# Patient Record
Sex: FEMALE | Race: OTHER
[De-identification: names, ages, dates, MRNs, and addresses within clinical notes are randomized per-mention and may not be internally consistent; named-entity substitution may affect disease eponyms.]

---

## 2017-11-24 ENCOUNTER — HOSPITAL ENCOUNTER (EMERGENCY)
Dept: HOSPITAL 62 - ER | Age: 27
Discharge: HOME | End: 2017-11-24
Payer: SELF-PAY

## 2017-11-24 VITALS — SYSTOLIC BLOOD PRESSURE: 123 MMHG | DIASTOLIC BLOOD PRESSURE: 70 MMHG

## 2017-11-24 DIAGNOSIS — R11.0: ICD-10-CM

## 2017-11-24 DIAGNOSIS — F17.200: ICD-10-CM

## 2017-11-24 DIAGNOSIS — M79.1: ICD-10-CM

## 2017-11-24 DIAGNOSIS — J02.9: Primary | ICD-10-CM

## 2017-11-24 PROCEDURE — 87880 STREP A ASSAY W/OPTIC: CPT

## 2017-11-24 PROCEDURE — 87070 CULTURE OTHR SPECIMN AEROBIC: CPT

## 2017-11-24 PROCEDURE — 99283 EMERGENCY DEPT VISIT LOW MDM: CPT

## 2017-11-24 PROCEDURE — 96372 THER/PROPH/DIAG INJ SC/IM: CPT

## 2017-11-26 ENCOUNTER — HOSPITAL ENCOUNTER (EMERGENCY)
Dept: HOSPITAL 62 - ER | Age: 27
Discharge: HOME | End: 2017-11-26
Payer: SELF-PAY

## 2017-11-26 VITALS — DIASTOLIC BLOOD PRESSURE: 63 MMHG | SYSTOLIC BLOOD PRESSURE: 112 MMHG

## 2017-11-26 DIAGNOSIS — J06.9: Primary | ICD-10-CM

## 2017-11-26 DIAGNOSIS — Z87.891: ICD-10-CM

## 2017-11-26 DIAGNOSIS — R50.9: ICD-10-CM

## 2017-11-26 DIAGNOSIS — M79.1: ICD-10-CM

## 2017-11-26 LAB
APPEARANCE UR: (no result)
BASOPHILS # BLD AUTO: 0 10^3/UL (ref 0–0.2)
BASOPHILS NFR BLD AUTO: 0.2 % (ref 0–2)
BILIRUB UR QL STRIP: NEGATIVE
EOSINOPHIL # BLD AUTO: 0 10^3/UL (ref 0–0.6)
EOSINOPHIL NFR BLD AUTO: 0 % (ref 0–6)
ERYTHROCYTE [DISTWIDTH] IN BLOOD BY AUTOMATED COUNT: 13.7 % (ref 11.5–14)
GLUCOSE UR STRIP-MCNC: NEGATIVE MG/DL
HCT VFR BLD CALC: 41.2 % (ref 36–47)
HGB BLD-MCNC: 13.8 G/DL (ref 12–15.5)
HGB HCT DIFFERENCE: 0.2
KETONES UR STRIP-MCNC: (no result) MG/DL
LYMPHOCYTES # BLD AUTO: 1.4 10^3/UL (ref 0.5–4.7)
LYMPHOCYTES NFR BLD AUTO: 17.2 % (ref 13–45)
MCH RBC QN AUTO: 31.1 PG (ref 27–33.4)
MCHC RBC AUTO-ENTMCNC: 33.5 G/DL (ref 32–36)
MCV RBC AUTO: 93 FL (ref 80–97)
MONOCYTES # BLD AUTO: 0.9 10^3/UL (ref 0.1–1.4)
MONOCYTES NFR BLD AUTO: 10.6 % (ref 3–13)
NEUTROPHILS # BLD AUTO: 5.8 10^3/UL (ref 1.7–8.2)
NEUTS SEG NFR BLD AUTO: 72 % (ref 42–78)
NITRITE UR QL STRIP: NEGATIVE
PH UR STRIP: 5 [PH] (ref 5–9)
PROT UR STRIP-MCNC: 100 MG/DL
RBC # BLD AUTO: 4.45 10^6/UL (ref 3.72–5.28)
SP GR UR STRIP: 1.03
UROBILINOGEN UR-MCNC: NEGATIVE MG/DL (ref ?–2)
WBC # BLD AUTO: 8 10^3/UL (ref 4–10.5)

## 2017-11-26 PROCEDURE — 85025 COMPLETE CBC W/AUTO DIFF WBC: CPT

## 2017-11-26 PROCEDURE — 86308 HETEROPHILE ANTIBODY SCREEN: CPT

## 2017-11-26 PROCEDURE — 99283 EMERGENCY DEPT VISIT LOW MDM: CPT

## 2017-11-26 PROCEDURE — 96375 TX/PRO/DX INJ NEW DRUG ADDON: CPT

## 2017-11-26 PROCEDURE — 81001 URINALYSIS AUTO W/SCOPE: CPT

## 2017-11-26 PROCEDURE — 96361 HYDRATE IV INFUSION ADD-ON: CPT

## 2017-11-26 PROCEDURE — 36415 COLL VENOUS BLD VENIPUNCTURE: CPT

## 2017-11-26 PROCEDURE — 87880 STREP A ASSAY W/OPTIC: CPT

## 2017-11-26 PROCEDURE — 96365 THER/PROPH/DIAG IV INF INIT: CPT

## 2017-11-26 PROCEDURE — 87070 CULTURE OTHR SPECIMN AEROBIC: CPT

## 2017-11-26 PROCEDURE — 84703 CHORIONIC GONADOTROPIN ASSAY: CPT

## 2017-11-26 NOTE — ER DOCUMENT REPORT
ED Flu Like





- General


Chief Complaint: Flu Symptoms


Stated Complaint: FLU SYMPTOMS


Time Seen by Provider: 17 15:02


Mode of Arrival: Ambulatory


Information source: Patient


Notes: 





27-year-old female presents to ED for complaint of sore throat fever for a 

week.  Cold sweats not eating or drinking for 2 days.  States she was seen in 

the emergency room 3 or 4 days ago for the same thing.


TRAVEL OUTSIDE OF THE [place] IN LAST 30 DAYS: No





- HPI


Onset: Last week


Timing/Duration: Worse


Quality of pain: Sharp


Severity: Severe


Pain Level: 5


Associated symptoms: Body/muscle aches, Chills, Fever, Nausea, Rhinnorhea, 

Shortness of breath, Sore throat


Similar symptoms previously: Yes


Recently seen / treated by doctor: Yes





- Related Data


Allergies/Adverse Reactions: 


 





dextromethorphan HBr [From Robitussin-DM] Allergy (Verified 17 14:12)


 Anaphylaxis


guaifenesin [From Robitussin-DM] Allergy (Verified 17 14:12)


 Anaphylaxis


scallops Allergy (Uncoded 17 14:12)


 Anaphylaxis











Past Medical History





- General


Information source: Patient





- Social History


Smoking Status: Former Smoker - Stopped a week ago


Cigarette use (# per day): No


Chew tobacco use (# tins/day): No


Smoking Education Provided: No


Frequency of alcohol use: None


Drug Abuse: None


Lives with: Family - States she lives just her and her kids


Family History: Reviewed & Not Pertinent


Patient has suicidal ideation: No


Patient has homicidal ideation: No





- Past Medical History


Cardiac Medical History: Reports: Hx Hypertension - with 1st pregnancy 


Pulmonary Medical History: Reports: Hx Bronchitis


EENT Medical History: Reports: None


Neurological Medical History: Reports: None


Endocrine Medical History: Reports: None


Renal/ Medical History: Reports: None


Malignancy Medical History: Reports: None


GI Medical History: Reports: Hx Gastroesophageal Reflux Disease - zantac with 

pregnancy 


Musculoskeltal Medical History: Reports None


Skin Medical History: Reports None


Psychiatric Medical History: Reports: None


Traumatic Medical History: Reports: None


Infectious Medical History: Reports: None.  Denies: Hx HIV


Past Surgical History: Reports: Hx  Section - x2





Review of Systems





- Review of Systems


Constitutional: Fever, Recent illness


EENT: Nose discharge, Sinus discharge, Throat pain


Cardiovascular: No symptoms reported


Respiratory: No symptoms reported


Gastrointestinal: Nausea, Vomiting


Genitourinary: No symptoms reported


Female Genitourinary: No symptoms reported


Musculoskeletal: No symptoms reported


Skin: No symptoms reported


Hematologic/Lymphatic: No symptoms reported


Neurological/Psychological: No symptoms reported


-: Yes All other systems reviewed and negative





Physical Exam





- Vital signs


Vitals: 


 











Temp Pulse Resp BP Pulse Ox


 


 99.7 F   93   14   126/73 H  99 


 


 17 14:12  17 14:12  17 14:12  17 14:12  17 14:12











Interpretation: Normal





- General


General appearance: Appears well, Alert





- HEENT


Head: Normocephalic, Atraumatic


Eyes: Normal


Pupils: PERRL


Ears: Normal


External canal: Normal


Tympanic membrane: Normal


Sinus: Normal


Nasal: Purulent discharge, Swelling


Mouth/Lips: Normal


Mucous membranes: Normal


Pharynx: Erythema, Exudate, Post nasal drainage, Tonsillar hypertrophy.  No: 

Uvular edema, Potential airway comprom.


Neck: Anterior cervical chain





- Respiratory


Respiratory status: No respiratory distress


Chest status: Nontender


Breath sounds: Normal


Chest palpation: Normal





- Cardiovascular


Rhythm: Regular


Heart sounds: Normal auscultation


Murmur: No





- Abdominal


Inspection: Normal


Distension: No distension


Bowel sounds: Normal


Tenderness: Nontender


Organomegaly: No organomegaly





- Back


Back: Normal, Nontender





- Extremities


General upper extremity: Normal inspection, Nontender, Normal color, Normal ROM

, Normal temperature


General lower extremity: Normal inspection, Nontender, Normal color, Normal ROM

, Normal temperature, Normal weight bearing.  No: Soren's sign





- Neurological


Neuro grossly intact: Yes


Cognition: Normal


Orientation: AAOx4


Wilton Coma Scale Eye Opening: Spontaneous


Wilton Coma Scale Verbal: Oriented


Hunter Coma Scale Motor: Obeys Commands


Hunter Coma Scale Total: 15


Speech: Normal


Motor strength normal: LUE, RUE, LLE, RLE


Sensory: Normal





- Psychological


Associated symptoms: Normal affect, Normal mood





- Skin


Skin Temperature: Warm


Skin Moisture: Dry


Skin Color: Normal





Course





- Re-evaluation


Re-evalutation: 





17 16:52


Strep is negative mono is negative CBC is negative these were discussed with 

patient and with Dr. Shafer.  Will discharge patient home on a week of 

penicillin.  The sore throat may be viral but penicillin is not become 

resistant and it could help the patient who is had a sore throat and then to 

the ER for the sore throat 2.  Patient states she is feeling better after the 

steroids and Toradol.





- Vital Signs


Vital signs: 


 











Temp Pulse Resp BP Pulse Ox


 


 98.1 F   72   18   112/63   98 


 


 17 17:37  17 17:37  17 17:37  17 17:37  17 17:37














- Laboratory


Result Diagrams: 


 17 16:05





Laboratory results interpreted by me: 


 











  17





  16:05


 


Urine Protein  100 H


 


Urine Ketones  TRACE H


 


Urine Ascorbic Acid  40 H














Discharge





- Discharge


Clinical Impression: 


 Sore throat





Upper respiratory infection


Qualifiers:


 URI type: unspecified URI Qualified Code(s): J06.9 - Acute upper respiratory 

infection, unspecified





Condition: Stable


Disposition: HOME, SELF-CARE


Instructions:  Family Physicians / Practices


Additional Instructions: 


UPPER RESPIRATORY ILLNESS:





     You have a viral infection of the respiratory passages -- a "cold."  This 

common infection causes nasal congestion, drainage, and often sore throat and 

cough.  It is highly contagious.  The disease usually lasts about 10 to 14 days.


     There is no "cure" for the viral infection -- it must run its course.  If 

there is a complication, such as bacterial infection in the nose, sinuses, 

middle ear, or bronchial tubes, antibiotics may be required.  The antibiotics 

won't affect the virus.


     Drink plenty of fluids.  A humidifier may help.  An expectorant medication 

or decongestant may make you more comfortable.  Use acetaminophen or ibuprofen 

for fever or aches.


     See the doctor if fever persists over two days, if there is any 

significant worsening of your symptoms, or if you simply fail to improve as 

expected.





SORE THROAT:





     Sore throats may be caused by viruses, bacteria, or fungi.  Most are due 

to a virus, and must get better on their own.  Bacterial sore throats, 

particularly those due to "strep," need treatment with antibiotics.


     If an antibiotic is prescribed, be sure to take the medication for a full 

10 days.  Failure to take the antibiotic can result in complications such as 

rheumatic fever.  Sometimes, an injection of antibiotics is given instead of 

pills or liquid.  This single "shot" is equal in effectiveness to the oral 

medication.


     To relieve symptoms, take acetaminophen for pain.  Sip clear liquids 

frequently, or eat popsicles or ice chips.  Anesthetic sprays or lozenges may 

help.  Make sure the air in the room is not too dry. Avoid using decongestants 

or antihistamines.


     Call the doctor if there is no improvement in two days, or if you have 

difficulty breathing, increasing throat pain, high fever, rash, or frequent 

vomiting.





PENICILLIN V K:


     You have been given a prescription for Penicillin VK.  Your physician has 

determined that this is the best antibiotic for your condition.


     Pen VK can be taken with meals, however more of the antibiotic gets into 

the bloodstream if it's taken on an empty stomach.


     Penicillin usually has no side effects.  However, allergy to penicillins 

is common.  If you have had an allergic reaction to any drug of the penicillin 

family, you should never take any other penicillin.  Notify your doctor at once 

if you develop hives, itching, swelling, faintness, or shortness of breath.





STEROID MEDICATION:


     You have been given an injection of or oral medicine of the cortisone/

steroid class.  This medication is used to control inflammation or allergy.  

Naveen t is usually only given for a short period of time, until the acute process 

subsides.


     There are usually no side effects from short-term use of cortisone-like 

medications.  Some persons feel an increased sense of well-being and are not 

sleepy at bedtime.  Long-term use of cortisone medications is best avoided, 

unless required for a severe condition.  If your condition does not remit, or 

relapses after the course of corticosteroid medication, you should consult your 

physician.








USE OF ACETAMINOPHEN (Tylenol):


     Acetaminophen may be taken for pain relief or fever control. It's much 

safer than aspirin, offering a wider range of "safe" dosages.  It is safe 

during pregnancy.  Some brand names are Tylenol, Panadol, Datril, Anacin 3, 

Tempra, and Liquiprin. Acetaminophen can be repeated every four hours.  The 

following are maximum recommended dosages:


>89 pounds or adults          650 mg to 900 mg


Acetaminophen can be repeated every four hours.  Maximum dose not to exceed 

4000 mg a day.





Toradol Injection





     You have been given an injection of ketorolac tromethamine (Toradol).  

This is an excellent, safe drug for pain control.  It also has potent 

antiinflammatory action.  You should have significant pain relief within about 

one hour.


     Toradol is not addicting and is non-sedating.  It does not interfere with 

driving or work.


     Call or return if you develop itching, hives, shortness of breath, or rash.





Rocephin





     You have been given an injection of an antibiotic called Rocephin (

ceftriaxone).  Sometimes the injection must be combined with antibiotic pills.  

For some infections, such as an uncomplicated ear infection, Rocephin provides 

all the antibiotic that's needed.


     The antibiotic will be in your body for about two days.  For serious 

infections, we usually repeat doses of Rocephin daily.


     Side effects are very unusual following a shot.  Women may develop vaginal 

yeast infections, and babies can get yeast (thrush) in the mouth following the 

use of antibiotics.  Contact your physician if you have symptoms with this 

medication.


     Allergy to this antibiotic can result in hives, wheezing, faintness, or 

itching.  If symptoms of allergy occur, call the doctor at once.








FOLLOW-UP CARE:


If you have been referred to a physician for follow-up care, call the physician

s office for an appointment as you were instructed or within the next two days.

  If you experience worsening or a significant change in your symptoms, notify 

the physician immediately or return to the Emergency Department at any time for 

re-evaluation.





Prescriptions: 


Penicillin V Potassium [Penicillin Vk 500 mg Tablet] 500 mg PO QID #28 tablet


Forms:  Elevated Blood Pressure

## 2017-11-28 ENCOUNTER — HOSPITAL ENCOUNTER (EMERGENCY)
Dept: HOSPITAL 62 - ER | Age: 27
Discharge: HOME | End: 2017-11-28
Payer: SELF-PAY

## 2017-11-28 VITALS — DIASTOLIC BLOOD PRESSURE: 57 MMHG | SYSTOLIC BLOOD PRESSURE: 105 MMHG

## 2017-11-28 DIAGNOSIS — R59.0: ICD-10-CM

## 2017-11-28 DIAGNOSIS — R11.2: ICD-10-CM

## 2017-11-28 DIAGNOSIS — R50.9: ICD-10-CM

## 2017-11-28 DIAGNOSIS — J02.9: Primary | ICD-10-CM

## 2017-11-28 LAB
ALBUMIN SERPL-MCNC: 4.4 G/DL (ref 3.5–5)
ALP SERPL-CCNC: 50 U/L (ref 38–126)
ALT SERPL-CCNC: 30 U/L (ref 9–52)
ANION GAP SERPL CALC-SCNC: 14 MMOL/L (ref 5–19)
AST SERPL-CCNC: 20 U/L (ref 14–36)
BASOPHILS # BLD AUTO: 0 10^3/UL (ref 0–0.2)
BASOPHILS NFR BLD AUTO: 0.2 % (ref 0–2)
BILIRUB DIRECT SERPL-MCNC: 0.3 MG/DL (ref 0–0.4)
BILIRUB SERPL-MCNC: 0.6 MG/DL (ref 0.2–1.3)
BUN SERPL-MCNC: 11 MG/DL (ref 7–20)
CALCIUM: 9.1 MG/DL (ref 8.4–10.2)
CHLORIDE SERPL-SCNC: 102 MMOL/L (ref 98–107)
CO2 SERPL-SCNC: 27 MMOL/L (ref 22–30)
CREAT SERPL-MCNC: 0.86 MG/DL (ref 0.52–1.25)
EOSINOPHIL # BLD AUTO: 0 10^3/UL (ref 0–0.6)
EOSINOPHIL NFR BLD AUTO: 0.1 % (ref 0–6)
ERYTHROCYTE [DISTWIDTH] IN BLOOD BY AUTOMATED COUNT: 13.8 % (ref 11.5–14)
GLUCOSE SERPL-MCNC: 83 MG/DL (ref 75–110)
HCT VFR BLD CALC: 41.2 % (ref 36–47)
HGB BLD-MCNC: 14.1 G/DL (ref 12–15.5)
HGB HCT DIFFERENCE: 1.1
LIPASE SERPL-CCNC: 72 U/L (ref 23–300)
LYMPHOCYTES # BLD AUTO: 2.2 10^3/UL (ref 0.5–4.7)
LYMPHOCYTES NFR BLD AUTO: 27.4 % (ref 13–45)
MCH RBC QN AUTO: 31.5 PG (ref 27–33.4)
MCHC RBC AUTO-ENTMCNC: 34.2 G/DL (ref 32–36)
MCV RBC AUTO: 92 FL (ref 80–97)
MONOCYTES # BLD AUTO: 0.9 10^3/UL (ref 0.1–1.4)
MONOCYTES NFR BLD AUTO: 11.7 % (ref 3–13)
NEUTROPHILS # BLD AUTO: 4.8 10^3/UL (ref 1.7–8.2)
NEUTS SEG NFR BLD AUTO: 60.6 % (ref 42–78)
POTASSIUM SERPL-SCNC: 3.9 MMOL/L (ref 3.6–5)
PROT SERPL-MCNC: 7.7 G/DL (ref 6.3–8.2)
RBC # BLD AUTO: 4.47 10^6/UL (ref 3.72–5.28)
SODIUM SERPL-SCNC: 142.6 MMOL/L (ref 137–145)
WBC # BLD AUTO: 7.9 10^3/UL (ref 4–10.5)

## 2017-11-28 PROCEDURE — 85025 COMPLETE CBC W/AUTO DIFF WBC: CPT

## 2017-11-28 PROCEDURE — 87040 BLOOD CULTURE FOR BACTERIA: CPT

## 2017-11-28 PROCEDURE — 80053 COMPREHEN METABOLIC PANEL: CPT

## 2017-11-28 PROCEDURE — 36415 COLL VENOUS BLD VENIPUNCTURE: CPT

## 2017-11-28 PROCEDURE — 70491 CT SOFT TISSUE NECK W/DYE: CPT

## 2017-11-28 PROCEDURE — 86665 EPSTEIN-BARR CAPSID VCA: CPT

## 2017-11-28 PROCEDURE — 86664 EPSTEIN-BARR NUCLEAR ANTIGEN: CPT

## 2017-11-28 PROCEDURE — 86663 EPSTEIN-BARR ANTIBODY: CPT

## 2017-11-28 PROCEDURE — 86308 HETEROPHILE ANTIBODY SCREEN: CPT

## 2017-11-28 PROCEDURE — 96375 TX/PRO/DX INJ NEW DRUG ADDON: CPT

## 2017-11-28 PROCEDURE — 83690 ASSAY OF LIPASE: CPT

## 2017-11-28 PROCEDURE — 86256 FLUORESCENT ANTIBODY TITER: CPT

## 2017-11-28 PROCEDURE — 99283 EMERGENCY DEPT VISIT LOW MDM: CPT

## 2017-11-28 PROCEDURE — 96374 THER/PROPH/DIAG INJ IV PUSH: CPT

## 2017-11-28 PROCEDURE — 84703 CHORIONIC GONADOTROPIN ASSAY: CPT

## 2017-11-28 PROCEDURE — 96361 HYDRATE IV INFUSION ADD-ON: CPT

## 2017-11-28 NOTE — ER DOCUMENT REPORT
HPI





- HPI


Patient complains to provider of: Sore throat


Onset: Other - 5 days


Onset/Duration: Persistent


Quality of pain: Sharp


Pain Level: 5


Context: 





Patient presents complaining of sore throat for the past 5 days with nausea and 

vomiting.  Patient states she has had a fever of 101.2 yesterday.  Patient was 

evaluated here recently for the same complaint and continues with throat pain 

symptoms.  Patient states that she is compliant with taking her penicillin.  

Patient reports that she is unable to keep any liquids down due to her throat 

discomfort.


Associated Symptoms: Fever, Sore throat.  denies: Earache


Exacerbated by: Denies


Relieved by: Denies


Similar symptoms previously: No


Recently seen / treated by doctor: Yes





- ROS


ROS below otherwise negative: Yes


Systems Reviewed and Negative: Yes All other systems reviewed and negative





- CONSTITUTIONAL


Constitutional: REPORTS: Fever, Chills





- EENT


EENT: REPORTS: Sore Throat





- RESPIRATORY


Respiratory: DENIES: Coughing





- GASTROINTESTINAL


Gastrointestinal: REPORTS: Nausea, Patient vomiting.  DENIES: Abdominal Pain





- MUSCULOSKELETAL


Musculoskeletal: DENIES: Neck Pain





- DERM


Skin Color: Normal


Skin Problems: None





Past Medical History





- General


Information source: Patient





- Social History


Smoking Status: Never Smoker


Chew tobacco use (# tins/day): No


Frequency of alcohol use: None


Drug Abuse: None


Occupation: None


Lives with: Family


Family History: Reviewed & Not Pertinent


Patient has suicidal ideation: No


Patient has homicidal ideation: No





- Past Medical History


Cardiac Medical History: Reports: Hx Hypertension - with 1st pregnancy 


   Denies: Hx Pulmonary Embolism, Hx Heart Murmur


Pulmonary Medical History: Reports: Hx Bronchitis


   Denies: Hx Asthma, Hx Sleep Apnea, Hx Tuberculosis


Neurological Medical History: Denies: Hx Cerebrovascular Accident, Hx Seizures


Endocrine Medical History: Denies: Hx Hyperthyroidism, Hx Hypothyroidism


Renal/ Medical History: Denies: Hx Kidney Stones, Hx Ovarian Cysts, Hx 

Peritoneal Dialysis, Hx Pelvic Inflammatory Disease


Malignancy Medical History: Denies: Hx Breast Cancer, Hx Cervical Cancer, Hx 

Ovarian Cancer


GI Medical History: Reports: Hx Gastroesophageal Reflux Disease - zantac with 

pregnancy .  Denies: Hx Hiatal Hernia, Hx Ulcer


Musculoskeltal Medical History: Denies Hx Fibromyalgia


Psychiatric Medical History: 


   Denies: Hx Bipolar Disorder, Hx Depression, Hx Post Traumatic Stress Disorder

, Hx Schizophrenia


Traumatic Medical History: Denies: Hx Fractures


Infectious Medical History: Denies: Hx HIV


Past Surgical History: Reports: Hx  Section - x2





Vertical Provider Document





- CONSTITUTIONAL


Agree With Documented VS: Yes


Exam Limitations: No Limitations


General Appearance: WD/WN, No Apparent Distress





- INFECTION CONTROL


TRAVEL OUTSIDE OF THE [place] IN LAST 30 DAYS: No





- HEENT


HEENT: Atraumatic, Normocephalic, Pharyngeal Exudate, Pharyngeal Tenderness, 

Pharyngeal Erythema.  negative: Tympanic Membrane Red, Tympanic Membrane Bulging





- NECK


Neck: Lymphadenopathy-Left, Lymphadenopathy-Right





- RESPIRATORY


Respiratory: Breath Sounds Normal, No Respiratory Distress


O2 Sat by Pulse Oximetry: 98





- CARDIOVASCULAR


Cardiovascular: Regular Rate, Regular Rhythm, No Murmur





- BACK


Back: Normal Inspection.  negative: CVA Tenderness-Right, CVA Tenderness-Left





- MUSCULOSKELETAL/EXTREMETIES


Musculoskeletal/Extremeties: MAEW, FROM





- NEURO


Level of Consciousness: Awake, Alert, Appropriate


Motor/Sensory: No Motor Deficit





- DERM


Integumentary: Warm, Dry, No Rash





Course





- Re-evaluation


Re-evalutation: 





17 14:00


Dr hassan to consent for examination.  Agrees with plan for CT imaging and 

recommends consultation with ENT doctor.





17 14:15


Consulted with ENT DrAlicia, Dr. Castaneda who recommends giving patient Augmentin 

orally 3 times a day as well as Decadron 8 mg once daily for 3 days.  Also 

recommends ordering Cornelio-Barr virus titer and can follow-up as an outpatient 

in the office.





Patient has been able to tolerate oral fluids without vomiting.  Patient states 

she is feeling better, but states this is typically what has happened over the 

past few ER visits in which she feels better here and then her symptoms get 

worse at home.  Patient encouraged to continue to increase oral fluids.  No 

concern for any potential airway compromise at this time.  Voice normal.  No 

concern for peritonsillar abscess.








- Vital Signs


Vital signs: 


 











Temp Pulse Resp BP Pulse Ox


 


 99.7 F   94   18   123/71   98 


 


 17 11:45  17 11:45  17 11:45  17 11:45  17 11:45














- Laboratory


Result Diagrams: 


 17 13:45





 17 13:45


Laboratory results interpreted by me: 





17 15:48


 Labs- Entire Visit











  17





  13:45 13:45 13:45


 


WBC  7.9  


 


RBC  4.47  


 


Hgb  14.1  


 


Hct  41.2  


 


MCV  92  


 


MCH  31.5  


 


MCHC  34.2  


 


RDW  13.8  


 


Plt Count  219  


 


Seg Neutrophils %  60.6  


 


Lymphocytes %  27.4  


 


Monocytes %  11.7  


 


Eosinophils %  0.1  


 


Basophils %  0.2  


 


Absolute Neutrophils  4.8  


 


Absolute Lymphocytes  2.2  


 


Absolute Monocytes  0.9  


 


Absolute Eosinophils  0.0  


 


Absolute Basophils  0.0  


 


Sodium   142.6 


 


Potassium   3.9 


 


Chloride   102 


 


Carbon Dioxide   27 


 


Anion Gap   14 


 


BUN   11 


 


Creatinine   0.86 


 


Est GFR ( Amer)   > 60 


 


Est GFR (Non-Af Amer)   > 60 


 


Glucose   83 


 


Calcium   9.1 


 


Total Bilirubin   0.6 


 


Direct Bilirubin   0.3 


 


Neonat Total Bilirubin   Not Reportable 


 


Neonat Direct Bilirubin   Not Reportable 


 


Neonat Indirect Bili   Not Reportable 


 


AST   20 


 


ALT   30 


 


Alkaline Phosphatase   50 


 


Total Protein   7.7 


 


Albumin   4.4 


 


Lipase   72.0 


 


Serum HCG, Qual    NEGATIVE


 


Monotest   














  17





  13:45


 


WBC 


 


RBC 


 


Hgb 


 


Hct 


 


MCV 


 


MCH 


 


MCHC 


 


RDW 


 


Plt Count 


 


Seg Neutrophils % 


 


Lymphocytes % 


 


Monocytes % 


 


Eosinophils % 


 


Basophils % 


 


Absolute Neutrophils 


 


Absolute Lymphocytes 


 


Absolute Monocytes 


 


Absolute Eosinophils 


 


Absolute Basophils 


 


Sodium 


 


Potassium 


 


Chloride 


 


Carbon Dioxide 


 


Anion Gap 


 


BUN 


 


Creatinine 


 


Est GFR ( Amer) 


 


Est GFR (Non-Af Amer) 


 


Glucose 


 


Calcium 


 


Total Bilirubin 


 


Direct Bilirubin 


 


Neonat Total Bilirubin 


 


Neonat Direct Bilirubin 


 


Neonat Indirect Bili 


 


AST 


 


ALT 


 


Alkaline Phosphatase 


 


Total Protein 


 


Albumin 


 


Lipase 


 


Serum HCG, Qual 


 


Monotest  NEGATIVE














- Diagnostic Test


Radiology reviewed: Reports reviewed





Discharge





- Discharge


Clinical Impression: 


 Sore throat





Pharyngitis


Qualifiers:


 Pharyngitis/tonsillitis etiology: unspecified etiology Qualified Code(s): 

J02.9 - Acute pharyngitis, unspecified





Nausea & vomiting


Qualifiers:


 Vomiting type: unspecified Vomiting Intractability: non-intractable Qualified 

Code(s): R11.2 - Nausea with vomiting, unspecified





Condition: Stable


Disposition: HOME, SELF-CARE


Instructions:  Antinausea Medication ([facility]), Intravenous (IV) Fluids ([facility]), Sore 

Throat ([facility]), Vomiting ([facility])


Additional Instructions: 


Return immediately for any new or worsening symptoms





Followup with your primary care provider, call tomorrow to make a followup 

appointment





Increase oral fluids





You can stop taking the penicillin and instead take Augmentin.





Follow-up with the ear nose and throat doctor, call their office tomorrow for 

an appointment time


Prescriptions: 


Amox Tr/Potassium Clavulanate [Augmentin 400-57 mg/5 mL Suspension] 7 ml PO TID 

#210 ml


Dexamethasone [Decadron 4 Mg Tablet] 4 mg PO ASDIR #4 tablet


Hydrocodone/Acetaminophen [Lortab 7.5-325 mg/15 ml Oral Soln] 10 ml PO Q6H PRN #

120 ml


 PRN Reason: 


Ondansetron HCl [Zofran 4 mg Tablet] 1 - 2 tab PO Q6 PRN #15 tablet


 PRN Reason: 


Referrals: 


NESHA MERINO PA-C [Primary Care Provider] - Follow up as needed


[facility] [Provider Group] - Follow up tomorrow

## 2017-11-28 NOTE — RADIOLOGY REPORT (SQ)
EXAM DESCRIPTION:  CT SOFT TISSUE NECK WITH



COMPLETED DATE/TIME:  11/28/2017 2:39 pm



REASON FOR STUDY:  sore throat, ?abscess



COMPARISON:  None.



TECHNIQUE:  Post IV contrasted scanning from skull base through lung apices with review of bone, soft
 tissue and lung windows.  Reconstructed coronal and sagittal MPR images reviewed.  All images stored
 on PACS.

All CT scanners at this facility use dose modulation, iterative reconstruction, and/or weight based d
osing when appropriate to reduce radiation dose to as low as reasonably achievable (ALARA).

[facility]: Dose Right  [facility]: CareDose    [facility]: Dose Right    [facility]: Teradose 4D    [facility]: Smart Technologies



CONTRAST TYPE AND DOSE:  contrast/concentration: Isovue 370.00 mg/ml; Total Contrast Delivered: 75.0 
ml; Total Saline Delivered: 55.0 ml



RENAL FUNCTION:  None required. The patient is less than 50 years old.



RADIATION DOSE:  24.2 mGy .



LIMITATIONS:  None.



FINDINGS:  SKULL BASE: Intact.

MAJOR SALIVARY GLANDS: No solid or cystic masses.  No inflammatory changes.

LYMPHADENOPATHY: Mild cervical adenopathy is present along the carotid space and posterior triangles.
  Enlarged bilateral jugulodigastric lymph nodes, likely reactive given pharyngitis.

MUCOSAL MASSES OR ASYMMETRY: There is enlargement of the pharyngeal tonsils bilaterally.  No discrete
 intra tonsillar or peritonsillar abscess.  Right tonsil measures 3.8 x 3 x 2 cm in size.  Left tonsi
l measures 4 x 3 x 1.8 cm in size.

LARYNX/CORDS: No abnormal findings.

VASCULAR STRUCTURES: The major vessels are patent.

LUNG APICES: Clear.

BONES: Intact.

THYROID: Normal size.  No masses.

PARANASAL SINUSES: Clear.

OTHER: No other significant finding.



IMPRESSION:  Enlarged pharyngeal tonsils without discrete intra or peritonsillar abscess.  Reactive c
ervical adenopathy.



TECHNICAL DOCUMENTATION:  JOB ID:  [id number]

Quality ID # [id number]: Final reports with documentation of one or more dose reduction techniques (e.g., Au
tomated exposure control, adjustment of the mA and/or kV according to patient size, use of iterative 
reconstruction technique)

 2011 [employer]- All Rights Reserved

## 2017-11-30 LAB — EBV EA IGG SER-ACNC: <9 U/ML (ref 0–8.9)

## 2018-06-04 ENCOUNTER — HOSPITAL ENCOUNTER (EMERGENCY)
Dept: HOSPITAL 62 - ER | Age: 28
LOS: 1 days | Discharge: HOME | End: 2018-06-05
Payer: SELF-PAY

## 2018-06-04 DIAGNOSIS — R09.81: ICD-10-CM

## 2018-06-04 DIAGNOSIS — R50.9: ICD-10-CM

## 2018-06-04 DIAGNOSIS — J00: Primary | ICD-10-CM

## 2018-06-04 DIAGNOSIS — R05: ICD-10-CM

## 2018-06-04 DIAGNOSIS — R09.89: ICD-10-CM

## 2018-06-04 PROCEDURE — 99283 EMERGENCY DEPT VISIT LOW MDM: CPT

## 2018-06-05 VITALS — DIASTOLIC BLOOD PRESSURE: 55 MMHG | SYSTOLIC BLOOD PRESSURE: 107 MMHG

## 2018-06-05 NOTE — ER DOCUMENT REPORT
ED Fever





- General


Chief Complaint: Fever


Stated Complaint: FEVER


Time Seen by Provider: 18 00:49


Mode of Arrival: Ambulatory


Information source: Patient


Notes: 


28-year-old female patient with complaint of fever, cough, congestion, runny 

nose 12 hours.  Patient reports that she has not taken any medications at home 

for these symptoms.  Patient reports that she works at [employer] and has been 

exposed to many customers who she stated had similar symptoms.  Patient denies 

any nausea, vomiting or diarrhea.


TRAVEL OUTSIDE OF THE [place] IN LAST 30 DAYS: No





- Related Data


Allergies/Adverse Reactions: 


 





dextromethorphan HBr [From Robitussin-DM] Allergy (Verified 18 23:20)


 Anaphylaxis


guaifenesin [From Robitussin-DM] Allergy (Verified 18 23:20)


 Anaphylaxis


scallops Allergy (Uncoded 18 23:20)


 Anaphylaxis











Past Medical History





- General


Information source: Patient





- Social History


Smoking Status: Never Smoker


Frequency of alcohol use: None


Drug Abuse: None


Family History: Reviewed & Not Pertinent





- Past Medical History


Cardiac Medical History: Reports: Hx Hypertension - with 1st pregnancy 


   Denies: Hx Pulmonary Embolism, Hx Heart Murmur


Pulmonary Medical History: Reports: Hx Bronchitis


   Denies: Hx Asthma, Hx Sleep Apnea, Hx Tuberculosis


Neurological Medical History: Denies: Hx Cerebrovascular Accident, Hx Seizures


Endocrine Medical History: Denies: Hx Hyperthyroidism, Hx Hypothyroidism


Renal/ Medical History: Denies: Hx Kidney Stones, Hx Ovarian Cysts, Hx 

Peritoneal Dialysis, Hx Pelvic Inflammatory Disease


Malignancy Medical History: Denies: Hx Breast Cancer, Hx Cervical Cancer, Hx 

Ovarian Cancer


GI Medical History: Reports: Hx Gastroesophageal Reflux Disease - zantac with 

pregnancy .  Denies: Hx Hiatal Hernia, Hx Ulcer


Musculoskeltal Medical History: Denies Hx Fibromyalgia


Psychiatric Medical History: 


   Denies: Hx Bipolar Disorder, Hx Depression, Hx Post Traumatic Stress Disorder

, Hx Schizophrenia


Traumatic Medical History: Denies: Hx Fractures


Infectious Medical History: Denies: Hx HIV


Past Surgical History: Reports: Hx  Section - x2





Review of Systems





- Review of Systems


Constitutional: No symptoms reported


EENT: See HPI


Cardiovascular: No symptoms reported


Respiratory: See HPI


Gastrointestinal: No symptoms reported


Genitourinary: No symptoms reported


Female Genitourinary: No symptoms reported


Musculoskeletal: No symptoms reported


Skin: No symptoms reported


Hematologic/Lymphatic: No symptoms reported


Neurological/Psychological: No symptoms reported





Physical Exam





- Vital signs


Vitals: 


 











Temp Pulse Resp BP Pulse Ox


 


 99.6 F   94   20   111/58 L  100 


 


 18 23:27  18 23:27  18 23:27  18 23:27  18 23:27














- Notes


Notes: 





PHYSICAL EXAMINATION:





GENERAL: Well-appearing, well-nourished and in no acute distress.





HEAD: Atraumatic, normocephalic.





EYES: Pupils equal round and reactive to light, extraocular movements intact, 

conjunctiva are normal.





ENT: Nares patent, oropharynx clear without exudates.  Moist mucous membranes.





NECK: Normal range of motion, supple without lymphadenopathy





LUNGS: Breath sounds clear to auscultation bilaterally and equal.  No wheezes 

rales or rhonchi.





HEART: Regular rate and rhythm without murmurs





ABDOMEN: Soft, nontender, nondistended abdomen.  No guarding, no rebound.  No 

masses appreciated.





Female : deferred





Musculoskeletal: Normal range of motion, no pitting or edema.  No cyanosis.





NEUROLOGICAL: Cranial nerves grossly intact.  Normal speech, normal gait.  

Normal sensory, motor exams





PSYCH: Normal mood, normal affect.





SKIN: Warm, Dry, normal turgor, no rashes or lesions noted.





Course





- Re-evaluation


Re-evalutation: 


Patient's examination is consistent with viral upper respiratory illness.  

Patient has only had symptoms for 12 hours patient's exam is unremarkable.  

Will discharge patient with supportive care.  Patient agrees to follow-up with 

her primary care provider if she does not have improvement of her symptoms in 

the next 3-5 days.  Patient will return if she has worsening symptoms to 

include fever that is unrelieved with either acetaminophen or ibuprofen.





- Vital Signs


Vital signs: 


 











Temp Pulse Resp BP Pulse Ox


 


 100.5 F H  95   18   107/55 L  99 


 


 18 01:44  18 01:44  18 01:44  18 01:44  18 01:44














Discharge





- Discharge


Clinical Impression: 


Upper respiratory infection


Qualifiers:


 URI type: acute nasopharyngitis (common cold) Qualified Code(s): J00 - Acute 

nasopharyngitis [common cold]





Condition: Stable


Disposition: HOME, SELF-CARE


Additional Instructions: 


Upper Respiratory Illness





     You have a viral infection of the respiratory passages -- a "cold."  This 

common infection causes nasal congestion, drainage, and often sore throat and 

cough.  The disease usually lasts a week or more, though the worst symptoms are 

usually over in 3 or 4 days.


     There is no "cure" for the viral infection -- it must run its course.  If 

there is a complication, such as bacterial infection in the nose, sinuses, 

middle ear, or bronchial tubes, antibiotics may be required, but antibiotics won

't affect the virus.


     If you smoke, you should STOP!!  Drink plenty of fluids.  A humidifier may 

help.  An expectorant medication or decongestant may make you more comfortable.

  Use acetaminophen or ibuprofen for fever or aches.


     See the doctor if fever persists over two or three days, if there is any 

significant worsening of your symptoms, or if you simply fail to improve as 

expected.





Headache





     The physician does not feel that the headache you are experiencing has a 

serious underlying cause.  Most headaches are due to emotional stress, with 

resultant muscle tension (tension headache). Occasionally, headaches are 

secondary to changes in the blood vessels of the scalp (vascular headache and 

migraine headache).  Sometimes, a headache is the first symptom of another 

developing illness, such as a viral infection.  You have no evidence of stroke, 

bleeding, meningitis, or other serious cause of your headache.


     The treatment of headaches varies with the severity and cause of the pain.

  Not all headaches need pain shots.  In fact, there is evidence that using 

narcotics for headaches may make them worse in the long run.  The physician 

will determine the therapy that's in your best interest.


     If you develop a fever, if the headache is different from any you've 

previously experienced, or if the headache progressively worsens, then call 

your physician at once or go to the emergency room.








Please take medications as prescribed.  Follow up with your primary care 

provider in the next 3-5 days if your symptoms continue.





Prescriptions: 


Fluticasone Propionate [Flonase Allergy Relief] 9.9 ml NS BID #1 bottle


Ibuprofen [Motrin 800 mg Tablet] 800 mg PO Q8H PRN #30 tab


 PRN Reason: For Pain


Forms:  Return to Work


Referrals: 


NESHA MERINO PA-C [Primary Care Provider] - Follow up as needed

## 2018-12-03 ENCOUNTER — HOSPITAL ENCOUNTER (EMERGENCY)
Dept: HOSPITAL 62 - ER | Age: 28
Discharge: HOME | End: 2018-12-03
Payer: SELF-PAY

## 2018-12-03 VITALS — DIASTOLIC BLOOD PRESSURE: 58 MMHG | SYSTOLIC BLOOD PRESSURE: 104 MMHG

## 2018-12-03 DIAGNOSIS — R19.7: ICD-10-CM

## 2018-12-03 DIAGNOSIS — R11.10: Primary | ICD-10-CM

## 2018-12-03 DIAGNOSIS — R10.13: ICD-10-CM

## 2018-12-03 DIAGNOSIS — I10: ICD-10-CM

## 2018-12-03 LAB
ADD MANUAL DIFF: NO
ALBUMIN SERPL-MCNC: 4.4 G/DL (ref 3.5–5)
ALP SERPL-CCNC: 48 U/L (ref 38–126)
ALT SERPL-CCNC: 16 U/L (ref 9–52)
ANION GAP SERPL CALC-SCNC: 10 MMOL/L (ref 5–19)
AST SERPL-CCNC: 19 U/L (ref 14–36)
BASOPHILS # BLD AUTO: 0 10^3/UL (ref 0–0.2)
BASOPHILS NFR BLD AUTO: 0.2 % (ref 0–2)
BILIRUB DIRECT SERPL-MCNC: 0.2 MG/DL (ref 0–0.4)
BILIRUB SERPL-MCNC: 0.8 MG/DL (ref 0.2–1.3)
BUN SERPL-MCNC: 9 MG/DL (ref 7–20)
CALCIUM: 9.4 MG/DL (ref 8.4–10.2)
CHLORIDE SERPL-SCNC: 105 MMOL/L (ref 98–107)
CO2 SERPL-SCNC: 27 MMOL/L (ref 22–30)
EOSINOPHIL # BLD AUTO: 0.1 10^3/UL (ref 0–0.6)
EOSINOPHIL NFR BLD AUTO: 1.4 % (ref 0–6)
ERYTHROCYTE [DISTWIDTH] IN BLOOD BY AUTOMATED COUNT: 13.3 % (ref 11.5–14)
GLUCOSE SERPL-MCNC: 80 MG/DL (ref 75–110)
HCT VFR BLD CALC: 37.3 % (ref 36–47)
HGB BLD-MCNC: 12.7 G/DL (ref 12–15.5)
LIPASE SERPL-CCNC: 78.4 U/L (ref 23–300)
LYMPHOCYTES # BLD AUTO: 2.6 10^3/UL (ref 0.5–4.7)
LYMPHOCYTES NFR BLD AUTO: 36.3 % (ref 13–45)
MCH RBC QN AUTO: 31.9 PG (ref 27–33.4)
MCHC RBC AUTO-ENTMCNC: 33.9 G/DL (ref 32–36)
MCV RBC AUTO: 94 FL (ref 80–97)
MONOCYTES # BLD AUTO: 0.5 10^3/UL (ref 0.1–1.4)
MONOCYTES NFR BLD AUTO: 7.1 % (ref 3–13)
NEUTROPHILS # BLD AUTO: 3.9 10^3/UL (ref 1.7–8.2)
NEUTS SEG NFR BLD AUTO: 55 % (ref 42–78)
PLATELET # BLD: 258 10^3/UL (ref 150–450)
POTASSIUM SERPL-SCNC: 4 MMOL/L (ref 3.6–5)
PROT SERPL-MCNC: 7.9 G/DL (ref 6.3–8.2)
RBC # BLD AUTO: 3.97 10^6/UL (ref 3.72–5.28)
SODIUM SERPL-SCNC: 141.6 MMOL/L (ref 137–145)
TOTAL CELLS COUNTED % (AUTO): 100 %
WBC # BLD AUTO: 7.1 10^3/UL (ref 4–10.5)

## 2018-12-03 PROCEDURE — 99284 EMERGENCY DEPT VISIT MOD MDM: CPT

## 2018-12-03 PROCEDURE — 84703 CHORIONIC GONADOTROPIN ASSAY: CPT

## 2018-12-03 PROCEDURE — 83690 ASSAY OF LIPASE: CPT

## 2018-12-03 PROCEDURE — 96375 TX/PRO/DX INJ NEW DRUG ADDON: CPT

## 2018-12-03 PROCEDURE — 80053 COMPREHEN METABOLIC PANEL: CPT

## 2018-12-03 PROCEDURE — 96376 TX/PRO/DX INJ SAME DRUG ADON: CPT

## 2018-12-03 PROCEDURE — 36415 COLL VENOUS BLD VENIPUNCTURE: CPT

## 2018-12-03 PROCEDURE — S0119 ONDANSETRON 4 MG: HCPCS

## 2018-12-03 PROCEDURE — S0028 INJECTION, FAMOTIDINE, 20 MG: HCPCS

## 2018-12-03 PROCEDURE — 85025 COMPLETE CBC W/AUTO DIFF WBC: CPT

## 2018-12-03 PROCEDURE — 96374 THER/PROPH/DIAG INJ IV PUSH: CPT

## 2018-12-03 NOTE — ER DOCUMENT REPORT
HPI





- HPI


Patient complains to provider of: Sore throat


Pain Level: 5


Context: 





Patient is a 27-year-old female presents emergency department complaining of 

sore throat with associated  nausea, body aches.  Patient states that she has 

been having difficulty tolerating swallowing that she has been able to speak 

without any difficulty.  Denies any difficulty breathing, shortness of breath, 

cough, chest pain, vomiting, abdominal pain, diarrhea constipation.  She does 

admit to heartburn but she states that it is normal for her.





- REPRODUCTIVE


Reproductive: REPORTS: Pregnant:





Past Medical History





- Social History


Smoking Status: Current Every Day Smoker


Family History: Reviewed & Not Pertinent





- Past Medical History


Cardiac Medical History: Reports: Hx Hypertension - with 1st pregnancy 


   Denies: Hx Pulmonary Embolism, Hx Heart Murmur


Pulmonary Medical History: 


   Denies: Hx Asthma, Hx Sleep Apnea, Hx Tuberculosis


Neurological Medical History: Denies: Hx Cerebrovascular Accident, Hx Seizures


Endocrine Medical History: Denies: Hx Hyperthyroidism, Hx Hypothyroidism


Renal/ Medical History: Denies: Hx Kidney Stones, Hx Ovarian Cysts, Hx Pelvic 

Inflammatory Disease


Malignancy Medical History: Denies: Hx Breast Cancer, Hx Cervical Cancer, Hx 

Ovarian Cancer


GI Medical History: Reports: Hx Gastroesophageal Reflux Disease - zantac with 

pregnancy .  Denies: Hx Hiatal Hernia, Hx Ulcer


Musculoskeltal Medical History: Denies Hx Fibromyalgia


Psychiatric Medical History: 


   Denies: Hx Bipolar Disorder, Hx Depression, Hx Post Traumatic Stress Disorder

, Hx Schizophrenia


Traumatic Medical History: Denies: Hx Fractures


Infectious Medical History: Denies: Hx HIV





Vertical Provider Document





- CONSTITUTIONAL


Notes: 





PHYSICAL EXAM


GENERAL: Alert, interacts well. 


HEAD: Normocephalic, atraumatic.


HEENT: NCAT, pale conjunctiva, extraocular movements intact, pupils PERRL. 

external ear normal, no evidence of external auditory canal tenderness, blood/

drainage, cerumen impaction, TM intact without evidence of effusion, bulging, 

injection, MMM. Oral mucosa moist, tongue midline.  Pharyngeal erythema with 

tonsillar exudates.  Without evidence of peritonsillar abscess or airway 

compromise


NECK: Full range of motion. Supple. Trachea midline.


LUNGS: Clear to auscultation bilaterally, no wheezes, rales, or rhonchi. No 

respiratory distress.


HEART: Regular rate and rhythm. No murmurs, gallops, or rubs.


ABDOMEN: Soft,  nondistended, nontender. No guarding, rebound, or rigidity.. 

Bowel sounds present in all 4 quadrants.


EXTREMITIES: Moves all 4 extremities spontaneously. No edema, radial and 

dorsalis pedis pulses 2/4 bilaterally. No cyanosis. 


NEUROLOGICAL: Alert and oriented x4. Normal speech.


PSYCH: Normal affect, normal mood.


SKIN: Warm, dry, normal turgor. No rashes or lesions noted.














- INFECTION CONTROL


TRAVEL OUTSIDE OF THE [place] IN LAST 30 DAYS: No





- RESPIRATORY


O2 Sat by Pulse Oximetry: 99





Course





- Re-evaluation


Re-evalutation: 





11/24/17 14:27


Patient is a 27-year-old female who is hemodynamically stable, no acute 

distress with temp of 100 but otherwise afebrile.  Presentation and history is 

consistent with strep pharyngitis.  Rapid strep was negative but will treat 

with Decadron and penicillin at this time.  Patient is overall well appearance, 

vitals within normal limits, well-hydrated.  Patient denies any headache, neck 

pain, and has no evidence of meningismus on examination.  Lungs are clear 

bilaterally.  No evidence of respiratory distress.  Based on clinical exam and 

history, I do not suspect an acute pneumonia, meningitis, or an acute 

encephalitis.  No laboratory or imaging testing is indicated at this time.  

Will discharge patient with return precautions and followup recommendations.  

They are in agreement this plan have verbalized understanding return 

precautions.








- Vital Signs


Vital signs: 


 











Temp Pulse Resp BP Pulse Ox


 


 99.1 F   86   18   123/66   99 


 


 11/24/17 13:25  11/24/17 13:25  11/24/17 13:25  11/24/17 13:25  11/24/17 13:25














Discharge





- Discharge


Clinical Impression: 


Pharyngitis


Qualifiers:


 Pharyngitis/tonsillitis etiology: unspecified etiology Qualified Code(s): 

J02.9 - Acute pharyngitis, unspecified





Condition: Good


Disposition: HOME, SELF-CARE


Instructions:  Sore Throat ([facility]), Strep Throat ([facility])


Additional Instructions: 


You did receive treatment for strep today based on your physical exam and 

history.  Please follow-up with your primary care doctor. physical therapy

## 2018-12-03 NOTE — ER DOCUMENT REPORT
ED General





- General


Chief Complaint: Abdominal Pain


Stated Complaint: ABDOMINAL PAIN


Time Seen by Provider: 18 19:19


Notes: 





Patient is a pleasant 28-year-old female presents with complaint of vomiting 

diarrhea and some epigastric abdominal pain.  She does not think she be 

pregnant.  No dysuria.  No abnormal vaginal discharge or bleeding.  No fevers.  

No blood in her emesis or stool.  No sick contacts that she is aware of.  No 

other complaints at this time.


TRAVEL OUTSIDE OF THE [place] IN LAST 30 DAYS: No





- Related Data


Allergies/Adverse Reactions: 


 





dextromethorphan HBr [From Robitussin-DM] Allergy (Verified 18 23:20)


 Anaphylaxis


guaifenesin [From Robitussin-DM] Allergy (Verified 18 23:20)


 Anaphylaxis


NSAIDS (Non-Steroidal Anti-Inflamma Allergy (Verified 18 19:21)


 


scallops Allergy (Uncoded 18 23:20)


 Anaphylaxis











Past Medical History





- Social History


Smoking Status: Never Smoker


Chew tobacco use (# tins/day): No


Frequency of alcohol use: None


Drug Abuse: None


Family History: Reviewed & Not Pertinent


Patient has suicidal ideation: No


Patient has homicidal ideation: No





- Past Medical History


Cardiac Medical History: Reports: Hx Hypertension - with 1st pregnancy 


   Denies: Hx Pulmonary Embolism, Hx Heart Murmur


Pulmonary Medical History: Reports: Hx Bronchitis


   Denies: Hx Asthma, Hx Sleep Apnea, Hx Tuberculosis


Neurological Medical History: Denies: Hx Cerebrovascular Accident, Hx Seizures


Endocrine Medical History: Denies: Hx Hyperthyroidism, Hx Hypothyroidism


Renal/ Medical History: Denies: Hx Kidney Stones, Hx Ovarian Cysts, Hx 

Peritoneal Dialysis, Hx Pelvic Inflammatory Disease


Malignancy Medical History: Denies: Hx Breast Cancer, Hx Cervical Cancer, Hx 

Ovarian Cancer


GI Medical History: Reports: Hx Gastroesophageal Reflux Disease - zantac with 

pregnancy .  Denies: Hx Hiatal Hernia, Hx Ulcer


Musculoskeletal Medical History: Denies Hx Fibromyalgia


Psychiatric Medical History: 


   Denies: Hx Bipolar Disorder, Hx Depression, Hx Post Traumatic Stress Disorder

, Hx Schizophrenia


Traumatic Medical History: Denies: Hx Fractures


Infectious Medical History: Denies: Hx HIV


Past Surgical History: Reports: Hx  Section - x2





Review of Systems





- Review of Systems


Notes: 





My Normal Review Basic





REVIEW OF SYSTEMS:


CONSTITUTIONAL :  Denies fever,  chills, or sweats.  Denies recent illness.


EENT:   Denies eye, ear, throat, or mouth pain or symptoms.  Denies nasal or 

sinus congestion.


CARDIOVASCULAR:  Denies chest pain.


RESPIRATORY:  Denies cough, cold, or chest congestion.  Denies shortness of 

breath, difficulty breathing, or wheezing.


GASTROINTESTINAL: Epigastric pain.  Vomiting and diarrhea.


GENITOURINARY:  Denies difficulty urinating, painful urination, burning, 

frequency, or blood in urine.


FEMALE  GENITOURINARY:  Denies vaginal bleeding, abnormal or irregular periods. 


MUSCULOSKELETAL:  Denies neck or back pain or joint pain or swelling.


SKIN:   Denies rash or skin lesions.


NEUROLOGICAL:  Denies altered mental status or loss of consciousness.  Denies 

headache.  Denies weakness or paralysis or loss of use of either side.  Denies 

problems with gait or speech.  Denies sensory or motor loss.


ALL OTHER SYSTEMS REVIEWED AND NEGATIVE.





Physical Exam





- Vital signs


Vitals: 


 











Temp Pulse Resp BP Pulse Ox


 


 98.5 F   55 L  12   116/67   100 


 


 18 17:47  18 17:47  18 17:47  18 17:47  18 17:47














- Notes


Notes: 





General Appearance: Well nourished, alert, cooperative, no acute distress, no 

obvious discomfort.  well-appearing.


Vitals: reviewed, See vital signs table.


Head: no swelling or tenderness to the head


Eyes: PERRL, EOMI, Conjuctiva clear


Mouth: No decreasd moisture


Throat: No tonsillar inflammation, No airway obstruction,  No lymphadenopathy


Lungs: No wheezing, No rales, No rhonci, No accessory muscle use, good air 

exchange bilaterally.


Heart: Normal rate, Regular rythm, No murmur, no rub


Abdomen: Normal BS, soft, No rigidity, mild epigastric abdominal tenderness to 

palpation, No guarding, no rebound, no abdominal masses, no organomegaly


Extremities: good pulses in all extremities, no swelling or tenderness in the 

extremities, no edema.


Skin: warm, dry, appropriate color, no rash


Neuro: speech clear, oriented x 3, normal affect, responds appropriately to 

questions.





Course





- Re-evaluation


Re-evalutation: 





18 06:17


After nausea medications patient is feeling much improved.  Her laboratory 

evaluations are unremarkable to be discharged home.  I will prescribe her some 

nausea medicine to take home.  I encouraged her return to ER if she has 

intractable vomiting, and abdominal pain in the lower quadrants of her abdomen, 

fevers, or if she feels unwell.  Currently the patient has no pain to palpation 

over the gallbladder or appendix.  She looks very well and abdomen is only 

minimally tender in epigastric region therefore I do not feel that she needs 

any imaging studies at this time.  Patient agrees with plan and will be 

discharged home.





Dictation of this chart was performed using voice recognition software; 

therefore, there may be some unintended grammatical errors.





- Vital Signs


Vital signs: 


 











Temp Pulse Resp BP Pulse Ox


 


 98.5 F   61   18   104/58 L  100 


 


 18 23:16  18 23:16  18 23:16  18 23:16  18 23:16














- Laboratory


Result Diagrams: 


 18 20:12





 18 20:12





Discharge





- Discharge


Clinical Impression: 


 Vomiting and diarrhea





Condition: Good


Disposition: HOME, SELF-CARE


Additional Instructions: 


Please drink mainly clear, noncaffeinated liquids over the next 24 hours. 

please rest. please avoid fatty fired foods. please return to the ER 

immediately if you develop fevers, abdominal pain, recurrent vomiting, blood in 

your stool or emesis, or if you feel that you are worsening in any way.


Prescriptions: 


Ondansetron [Zofran Odt 4 mg Tablet] 1 tab PO Q4H PRN #15 tab.rapdis


 PRN Reason: For Nausea/Vomiting


Forms:  Return to Work


Referrals: 


NESHA MERINO PA-C [NO LOCAL MD] - Follow up in 3-5 days

## 2018-12-03 NOTE — ER DOCUMENT REPORT
ED Medical Screen (RME)





- General


Chief Complaint: Abdominal Pain


Stated Complaint: ABDOMINAL PAIN


Time Seen by Provider: 18 19:19


Notes: 





Patient is a 28-year-old female resenting to the emergency department 

complaining of epigastric abdominal pain.  Patient states pain started this 

morning and was sharp in nature.  Patient also admits to one episode of 

nonbloody nonbilious vomiting and 5 episodes of nonbloody diarrhea.  Patient 

states she is currently on her period which typically gives her lower abdominal 

cramps which she is denying at this time. Denies fever as well.





Patient states her only medical history is  section.


Patient denies any current medications


Patient states her allergies are Robitussin and Motrin








Physical exam: Well-appearing, no apparent distress.  Minor epigastric 

abdominal pain upon palpation, no Bender sign, no McBurney's point tenderness.  

No CVA tenderness bilateral.








I have greeted and performed a rapid initial assessment of this patient.  A 

comprehensive ED assessment and evaluation of the patient, analysis of test 

results and completion of the medical decision making process will be conducted 

by additional ED providers.








TRAVEL OUTSIDE OF THE [place] IN LAST 30 DAYS: No





- Related Data


Allergies/Adverse Reactions: 


 





dextromethorphan HBr [From Robitussin-DM] Allergy (Verified 18 23:20)


 Anaphylaxis


guaifenesin [From Robitussin-DM] Allergy (Verified 18 23:20)


 Anaphylaxis


scallops Allergy (Uncoded 18 23:20)


 Anaphylaxis











Past Medical History





- Past Medical History


Cardiac Medical History: Reports: Hx Hypertension - with 1st pregnancy 


   Denies: Hx Pulmonary Embolism, Hx Heart Murmur


Pulmonary Medical History: Reports: Hx Bronchitis


   Denies: Hx Asthma, Hx Sleep Apnea, Hx Tuberculosis


Neurological Medical History: Denies: Hx Cerebrovascular Accident, Hx Seizures


Endocrine Medical History: Denies: Hx Hyperthyroidism, Hx Hypothyroidism


Renal/ Medical History: Denies: Hx Kidney Stones, Hx Ovarian Cysts, Hx 

Peritoneal Dialysis, Hx Pelvic Inflammatory Disease


Malignancy Medical History: Denies: Hx Breast Cancer, Hx Cervical Cancer, Hx 

Ovarian Cancer


GI Medical History: Reports: Hx Gastroesophageal Reflux Disease - zantac with 

pregnancy .  Denies: Hx Hiatal Hernia, Hx Ulcer


Musculoskeltal Medical History: Denies Hx Fibromyalgia


Psychiatric Medical History: 


   Denies: Hx Bipolar Disorder, Hx Depression, Hx Post Traumatic Stress Disorder

, Hx Schizophrenia


Traumatic Medical History: Denies: Hx Fractures


Infectious Medical History: Denies: Hx HIV


Past Surgical History: Reports: Hx  Section - x2





Physical Exam





- Vital signs


Vitals: 





 











Temp Pulse Resp BP Pulse Ox


 


 98.5 F   55 L  12   116/67   100 


 


 18 17:47  18 17:47  18 17:47  18 17:47  18 17:47














Course





- Vital Signs


Vital signs: 





 











Temp Pulse Resp BP Pulse Ox


 


 98.5 F   55 L  12   116/67   100 


 


 18 17:47  18 17:47  18 17:47  18 17:47  18 17:47














Doctor's Discharge





- Discharge


Referrals: 


NESHA MERINO PA-C [Primary Care Provider] - Follow up as needed

## 2019-11-11 ENCOUNTER — HOSPITAL ENCOUNTER (EMERGENCY)
Dept: HOSPITAL 62 - ER | Age: 29
LOS: 1 days | Discharge: HOME | End: 2019-11-12
Payer: SELF-PAY

## 2019-11-11 DIAGNOSIS — R10.30: Primary | ICD-10-CM

## 2019-11-11 DIAGNOSIS — R10.2: ICD-10-CM

## 2019-11-11 DIAGNOSIS — F17.200: ICD-10-CM

## 2019-11-11 DIAGNOSIS — N89.8: ICD-10-CM

## 2019-11-11 DIAGNOSIS — R11.0: ICD-10-CM

## 2019-11-11 LAB
ADD MANUAL DIFF: NO
ALBUMIN SERPL-MCNC: 4.3 G/DL (ref 3.5–5)
ALP SERPL-CCNC: 54 U/L (ref 38–126)
ANION GAP SERPL CALC-SCNC: 11 MMOL/L (ref 5–19)
APPEARANCE UR: (no result)
APTT PPP: YELLOW S
AST SERPL-CCNC: 21 U/L (ref 14–36)
BACTERIA (WET MOUNT): (no result)
BASOPHILS # BLD AUTO: 0 10^3/UL (ref 0–0.2)
BASOPHILS NFR BLD AUTO: 0.2 % (ref 0–2)
BILIRUB DIRECT SERPL-MCNC: 0.1 MG/DL (ref 0–0.4)
BILIRUB SERPL-MCNC: 0.6 MG/DL (ref 0.2–1.3)
BILIRUB UR QL STRIP: NEGATIVE
BUN SERPL-MCNC: 9 MG/DL (ref 7–20)
CALCIUM: 9.9 MG/DL (ref 8.4–10.2)
CHLORIDE SERPL-SCNC: 106 MMOL/L (ref 98–107)
CO2 SERPL-SCNC: 26 MMOL/L (ref 22–30)
EOSINOPHIL # BLD AUTO: 0.1 10^3/UL (ref 0–0.6)
EOSINOPHIL NFR BLD AUTO: 1.2 % (ref 0–6)
EPITHELIALS (WET MOUNT): (no result)
ERYTHROCYTE [DISTWIDTH] IN BLOOD BY AUTOMATED COUNT: 12.9 % (ref 11.5–14)
GLUCOSE SERPL-MCNC: 90 MG/DL (ref 75–110)
GLUCOSE UR STRIP-MCNC: NEGATIVE MG/DL
HCT VFR BLD CALC: 39.2 % (ref 36–47)
HGB BLD-MCNC: 13.2 G/DL (ref 12–15.5)
KETONES UR STRIP-MCNC: NEGATIVE MG/DL
LYMPHOCYTES # BLD AUTO: 3.7 10^3/UL (ref 0.5–4.7)
LYMPHOCYTES NFR BLD AUTO: 46.4 % (ref 13–45)
MCH RBC QN AUTO: 31.9 PG (ref 27–33.4)
MCHC RBC AUTO-ENTMCNC: 33.7 G/DL (ref 32–36)
MCV RBC AUTO: 95 FL (ref 80–97)
MONOCYTES # BLD AUTO: 0.5 10^3/UL (ref 0.1–1.4)
MONOCYTES NFR BLD AUTO: 6.8 % (ref 3–13)
NEUTROPHILS # BLD AUTO: 3.7 10^3/UL (ref 1.7–8.2)
NEUTS SEG NFR BLD AUTO: 45.4 % (ref 42–78)
NITRITE UR QL STRIP: NEGATIVE
PH UR STRIP: 5 [PH] (ref 5–9)
PLATELET # BLD: 240 10^3/UL (ref 150–450)
POTASSIUM SERPL-SCNC: 3.9 MMOL/L (ref 3.6–5)
PROT SERPL-MCNC: 7.7 G/DL (ref 6.3–8.2)
PROT UR STRIP-MCNC: NEGATIVE MG/DL
RBC # BLD AUTO: 4.14 10^6/UL (ref 3.72–5.28)
SP GR UR STRIP: 1.02
T.VAGINALIS (WET MOUNT): (no result)
TOTAL CELLS COUNTED % (AUTO): 100 %
UROBILINOGEN UR-MCNC: NEGATIVE MG/DL (ref ?–2)
WBC # BLD AUTO: 8 10^3/UL (ref 4–10.5)
WBCS (WET MOUNT): (no result)
YEAST (WET MOUNT): (no result)

## 2019-11-11 PROCEDURE — 36415 COLL VENOUS BLD VENIPUNCTURE: CPT

## 2019-11-11 PROCEDURE — 81001 URINALYSIS AUTO W/SCOPE: CPT

## 2019-11-11 PROCEDURE — 83690 ASSAY OF LIPASE: CPT

## 2019-11-11 PROCEDURE — 87210 SMEAR WET MOUNT SALINE/INK: CPT

## 2019-11-11 PROCEDURE — 96375 TX/PRO/DX INJ NEW DRUG ADDON: CPT

## 2019-11-11 PROCEDURE — 96374 THER/PROPH/DIAG INJ IV PUSH: CPT

## 2019-11-11 PROCEDURE — 87491 CHLMYD TRACH DNA AMP PROBE: CPT

## 2019-11-11 PROCEDURE — 80053 COMPREHEN METABOLIC PANEL: CPT

## 2019-11-11 PROCEDURE — 81025 URINE PREGNANCY TEST: CPT

## 2019-11-11 PROCEDURE — 85025 COMPLETE CBC W/AUTO DIFF WBC: CPT

## 2019-11-11 PROCEDURE — 87591 N.GONORRHOEAE DNA AMP PROB: CPT

## 2019-11-11 PROCEDURE — 99284 EMERGENCY DEPT VISIT MOD MDM: CPT

## 2019-11-11 NOTE — ER DOCUMENT REPORT
ED GI/





- General


Chief Complaint: Abdominal Pain


Stated Complaint: LOWER ABDOMINAL PAIN


Time Seen by Provider: 19 20:45


Mode of Arrival: Ambulatory


Notes: 


Patient is a 29-year-old female that comes to the emergency department for chief

complaint of intermittent but sharp pelvic pain and vaginal discharge.  Symptoms

been going on for about a week.  She denies flank pain, fever/chills, vomiting 

but she does report occasional nausea.  She states that in her previous 

relationship her significant other cheated on her and she is concerned she might

have an STD.  She has had a tubal ligation and 2 C-sections, denies any daily 

medications, denies any complaints otherwise.


TRAVEL OUTSIDE OF THE [place] IN LAST 30 DAYS: No





- Related Data


Allergies/Adverse Reactions: 


                                        





dextromethorphan HBr [From Robitussin-DM] Allergy (Verified 19 20:32)


   Anaphylaxis


guaifenesin [From Robitussin-DM] Allergy (Verified 19 20:32)


   Anaphylaxis


NSAIDS (Non-Steroidal Anti-Inflamma Allergy (Verified 19 20:32)


   


scallops Allergy (Uncoded 19 20:32)


   Anaphylaxis








Home Medications: No home medications





Past Medical History





- General


Information source: Patient





- Social History


Smoking Status: Current Every Day Smoker


Chew tobacco use (# tins/day): No


Frequency of alcohol use: None


Drug Abuse: None


Lives with: Family


Family History: Reviewed & Not Pertinent


Patient has suicidal ideation: No


Patient has homicidal ideation: No





- Past Medical History


Cardiac Medical History: Reports: Hx Hypertension - with 1st pregnancy 


   Denies: Hx Pulmonary Embolism, Hx Heart Murmur


Pulmonary Medical History: Reports: Hx Bronchitis


   Denies: Hx Asthma, Hx Sleep Apnea, Hx Tuberculosis


Neurological Medical History: Denies: Hx Cerebrovascular Accident, Hx Seizures


Endocrine Medical History: Denies: Hx Hyperthyroidism, Hx Hypothyroidism


Renal/ Medical History: Denies: Hx Kidney Stones, Hx Ovarian Cysts, Hx Pe

ritoneal Dialysis, Hx Pelvic Inflammatory Disease


Malignancy Medical History: Denies: Hx Breast Cancer, Hx Cervical Cancer, Hx 

Ovarian Cancer


GI Medical History: Reports: Hx Gastroesophageal Reflux Disease - zantac with 

pregnancy .  Denies: Hx Hiatal Hernia, Hx Ulcer


Musculoskeletal Medical History: Denies Hx Fibromyalgia


Psychiatric Medical History: 


   Denies: Hx Bipolar Disorder, Hx Depression, Hx Post Traumatic Stress 

Disorder, Hx Schizophrenia


Traumatic Medical History: Denies: Hx Fractures


Infectious Medical History: Denies: Hx HIV


Past Surgical History: Reports: Hx  Section - x2





- Immunizations


Hx Diphtheria, Pertussis, Tetanus Vaccination: Yes





Review of Systems





- Review of Systems


Constitutional: No symptoms reported


EENT: No symptoms reported


Cardiovascular: No symptoms reported


Respiratory: No symptoms reported


Gastrointestinal: See HPI


Genitourinary: See HPI


Female Genitourinary: See HPI


Musculoskeletal: No symptoms reported


Skin: No symptoms reported


Hematologic/Lymphatic: No symptoms reported


Neurological/Psychological: No symptoms reported





Physical Exam





- Vital signs


Vitals: 


                                        











Temp Pulse Resp BP Pulse Ox


 


 98.6 F   71   20   139/94 H  99 


 


 19 20:28  19 20:28  19 20:28  19 20:28  19 20:28














- Notes


Notes: 





GENERAL: Alert, interacts well. No acute distress.


HEAD: Normocephalic, atraumatic.


EYES: Pupils equal, round, and reactive to light. Extraocular movements intact.


ENT: Oral mucosa moist, tongue midline. Oropharynx unremarkable. Airway patent.


LUNGS: Clear to auscultation bilaterally, no wheezes, rales, or rhonchi. No 

respiratory distress.


HEART: Regular rate and rhythm. No murmur


ABDOMEN: Soft, non-tender. Non-distended. Bowel sounds present in all 4 

quadrants.


GENITOURINARY: No concerning finding externally, pelvic exam with tenderness, 

patient does not tolerate this very well, there is some erythema of the cervix 

and there is some yellowish discharge noted.  No overt CMT however this is 

difficult to evaluate because of patient's tolerance.  Exam performed with 

Chika PCT at bedside.


EXTREMITIES: Moves all 4 extremities spontaneously. No edema, normal radial and 

dorsalis pedis pulses bilaterally. No cyanosis.


BACK: no cervical, thoracic, lumbar midline tenderness. No saddle anesthesia, 

normal distal neurovascular exam. Moves all extremities in full range of motion.


NEUROLOGICAL: Alert and oriented x3. Normal speech. Cranial nerves II through 

XII grossly intact. 


PSYCH: Normal affect, normal mood.


SKIN: Warm, dry, normal turgor. No rashes or lesions noted.





Course





- Re-evaluation


Re-evalutation: 


Patient with generalized pelvic pain, complaints of vaginal discharge and 

possible STD.  Exam does suggest pelvic infection with noted vaginal discharge 

and tenderness but no severe tenderness.  No fever.  CBC, chemistry 

unremarkable.  Urinalysis nonspecific.  Pregnancy test negative.





Wet mount shows 4+ bacteria, some white blood cells, positive yeast.  Gonorrhea 

and chlamydia pending.  Patient will be treated for pelvic infection, discussed 

follow-up, discussed expectations, discussed return precautions.  Patient states

appreciation and agreement.





- Vital Signs


Vital signs: 


                                        











Temp Pulse Resp BP Pulse Ox


 


 97.6 F   49 L  14   110/59 L  100 


 


 19 00:48  19 00:48  19 00:48  19 00:48  19 00:48














- Laboratory


Result Diagrams: 


                                 19 21:15





                                 19 21:15


Laboratory results interpreted by me: 


                                        











  19





  21:15 21:15


 


Lymph % (Auto)  46.4 H 


 


Ur Leukocyte Esterase   LARGE H














Discharge





- Discharge


Clinical Impression: 


 Lower abdominal pain, Vaginal discharge





Condition: Stable


Disposition: HOME, SELF-CARE


Additional Instructions: 


Your evaluation is consistent with a pelvic infection and yeast infection.  He 

has begun treatment for this, complete treatment by taking the Flagyl as 

prescribed and the Diflucan after you complete the Flagyl.  Take Tylenol or 

ibuprofen for pain, drink plenty of fluids.  Follow-up with primary care for 

additional management.





Return if you worsen including developing fever, vomiting, severe abdominal 

pain, or any other concerning symptoms.


Prescriptions: 


Fluconazole [Diflucan] 150 mg PO ONCE PRN #1 tablet


 PRN Reason: 


Metronidazole [Flagyl 500 mg Tablet] 500 mg PO BID 7 Days #14 tablet


Forms:  Return to Work

## 2019-11-11 NOTE — ER DOCUMENT REPORT
ED Medical Screen (RME)





- General


Chief Complaint: Abdominal Pain


Stated Complaint: LOWER ABDOMINAL PAIN


Time Seen by Provider: 19 20:45


Mode of Arrival: Ambulatory


Information source: Patient


Notes: 





Patient is a 29-year-old female presenting to the emergency department with 3-

month history of intermittent low abdominal pains.  Patient reports pains are 

sharp stabbing pains that come to the lower abdomen and pelvis area only lasting

for approximately 10 seconds and then going away.  Patient reports she has these

been several times a week.  She also reports associated abnormal vaginal 

discharge, states she is concerned for STDs.





Exam:


Abdomen soft, nontender without guarding or rebound.





I have greeted and performed a rapid initial assessment of this patient.  A 

comprehensive ED assessment and evaluation of the patient, analysis of test 

results and completion of the medical decision making process will be conducted 

by additional ED providers.  I have specifically instructed the patient or 

family members with the patient to immediately return to any nursing staff 

should anything change in the patient's condition or with their chief complaint.





This medical record was dictated with voice recognizing software.  There may be 

grammatical, syntax errors that are unintended.


TRAVEL OUTSIDE OF THE [place] IN LAST 30 DAYS: No





- Related Data


Allergies/Adverse Reactions: 


                                        





dextromethorphan HBr [From Robitussin-DM] Allergy (Verified 19 20:32)


   Anaphylaxis


guaifenesin [From Robitussin-DM] Allergy (Verified 19 20:32)


   Anaphylaxis


NSAIDS (Non-Steroidal Anti-Inflamma Allergy (Verified 19 20:32)


   


scallops Allergy (Uncoded 19 20:32)


   Anaphylaxis








Home Medications: No home medications





Past Medical History





- Social History


Chew tobacco use (# tins/day): No


Frequency of alcohol use: None


Drug Abuse: None





- Past Medical History


Cardiac Medical History: Reports: Hx Hypertension - with 1st pregnancy 


   Denies: Hx Pulmonary Embolism, Hx Heart Murmur


Pulmonary Medical History: Reports: Hx Bronchitis


   Denies: Hx Asthma, Hx Sleep Apnea, Hx Tuberculosis


Neurological Medical History: Denies: Hx Cerebrovascular Accident, Hx Seizures


Endocrine Medical History: Denies: Hx Hyperthyroidism, Hx Hypothyroidism


Renal/ Medical History: Denies: Hx Kidney Stones, Hx Ovarian Cysts, Hx 

Peritoneal Dialysis, Hx Pelvic Inflammatory Disease


Malignancy Medical History: Denies: Hx Breast Cancer, Hx Cervical Cancer, Hx 

Ovarian Cancer


GI Medical History: Reports: Hx Gastroesophageal Reflux Disease - zantac with 

pregnancy .  Denies: Hx Hiatal Hernia, Hx Ulcer


Musculoskeltal Medical History: Denies Hx Fibromyalgia


Psychiatric Medical History: 


   Denies: Hx Bipolar Disorder, Hx Depression, Hx Post Traumatic Stress 

Disorder, Hx Schizophrenia


Traumatic Medical History: Denies: Hx Fractures


Infectious Medical History: Denies: Hx HIV


Past Surgical History: Reports: Hx  Section - x2





Physical Exam





- Vital signs


Vitals: 





                                        











Temp Pulse Resp BP Pulse Ox


 


 98.6 F   71   20   139/94 H  99 


 


 19 20:28  19 20:28  19 20:28  19 20:28  19 20:28














Course





- Vital Signs


Vital signs: 





                                        











Temp Pulse Resp BP Pulse Ox


 


 98.6 F   71   20   139/94 H  99 


 


 19 20:28  19 20:28  19 20:28  19 20:28  19 20:28

## 2019-11-12 VITALS — SYSTOLIC BLOOD PRESSURE: 110 MMHG | DIASTOLIC BLOOD PRESSURE: 59 MMHG

## 2019-11-12 LAB — CHLAM PCR: NOT DETECTED

## 2020-10-30 ENCOUNTER — HOSPITAL ENCOUNTER (EMERGENCY)
Dept: HOSPITAL 62 - ER | Age: 30
Discharge: HOME | End: 2020-10-30
Payer: SELF-PAY

## 2020-10-30 VITALS — SYSTOLIC BLOOD PRESSURE: 122 MMHG | DIASTOLIC BLOOD PRESSURE: 69 MMHG
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
## 2020-10-30 DIAGNOSIS — S00.12XA: ICD-10-CM

## 2020-10-30 DIAGNOSIS — S01.112A: Primary | ICD-10-CM
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
## 2020-10-30 DIAGNOSIS — S10.91XA: ICD-10-CM

## 2020-10-30 PROCEDURE — S0119 ONDANSETRON 4 MG: HCPCS

## 2020-10-30 PROCEDURE — 12013 RPR F/E/E/N/L/M 2.6-5.0 CM: CPT

## 2020-10-30 PROCEDURE — 99284 EMERGENCY DEPT VISIT MOD MDM: CPT

## 2020-10-30 PROCEDURE — 70486 CT MAXILLOFACIAL W/O DYE: CPT

## 2020-10-30 NOTE — RADIOLOGY REPORT (SQ)
EXAM DESCRIPTION:  CT FACIAL AREA WITHOUT



IMAGES COMPLETED DATE/TIME:  10/30/2020 1:00 pm



REASON FOR STUDY:  facial injury, eval fracture



COMPARISON:  None.



TECHNIQUE:  Noncontrasted images through the facial bones and orbits windowed for bone and soft tissu
e.  Additional coronal and sagittal reconstructed images reviewed.  All images stored on PACS.

All CT scanners at this facility use dose modulation, iterative reconstruction, and/or weight based d
osing when appropriate to reduce radiation dose to as low as reasonably achievable (ALARA).

[facility]: Dose Right  [facility]: CareDose    [facility]: Dose Right    [facility]: Teradose 4D    [facility]: Smart Technologies



RADIATION DOSE:  CT Rad equipment meets quality standard of care and radiation dose reduction techniq
ues were employed. CTDIvol: 30.4 mGy. DLP: 560 mGy-cm. mGy.



LIMITATIONS:  None.



FINDINGS:  FACIAL BONES: No fracture or bone lesion.

ORBITS: Intact.  No fracture.  Symmetric intact globes and retroorbital soft tissues.

PARANASAL SINUSES: Clear.  No significant mucosal thickening, mass or fluid. No nasal polyps.  Maxill
holli sinus outlets are patent.

SOFT TISSUES: No mass or edema.

INFERIOR BRAIN: Limited view.  No acute findings.

OTHER: Occasional small cervical lymph node is identified most likely reactive.



IMPRESSION:  NO ACUTE FINDINGS.



TECHNICAL DOCUMENTATION:  JOB ID:  [id number]

Quality ID # [id number]: Final reports with documentation of one or more dose reduction techniques (e.g., Au
tomated exposure control, adjustment of the mA and/or kV according to patient size, use of iterative 
reconstruction technique)

 2011 [employer]- All Rights Reserved



Reading location - IP/workstation name: [email or web address]
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
